# Patient Record
Sex: FEMALE | Race: OTHER | HISPANIC OR LATINO
[De-identification: names, ages, dates, MRNs, and addresses within clinical notes are randomized per-mention and may not be internally consistent; named-entity substitution may affect disease eponyms.]

---

## 2022-07-22 PROBLEM — Z00.00 ENCOUNTER FOR PREVENTIVE HEALTH EXAMINATION: Status: ACTIVE | Noted: 2022-07-22

## 2022-07-25 ENCOUNTER — APPOINTMENT (OUTPATIENT)
Dept: HEART AND VASCULAR | Facility: CLINIC | Age: 27
End: 2022-07-25

## 2022-07-25 PROCEDURE — 99214 OFFICE O/P EST MOD 30 MIN: CPT

## 2022-07-28 NOTE — ASSESSMENT
[FreeTextEntry1] : This is a 26-year-old female who we saw once here (but not treated) about 10 years ago for scattered painful venous malformations in her right upper extremity She subsequently had several of these lesions surgically removed by Dr. Fam (elbow, forearm, hand). She now has two painful lesions, one on the right ulnar wrist and one in the posterior elbow. The elbow lesion is fairly firm and lumpy and quite tender to palpation. Ultrasound demonstrates a complex lesion which is partially compressible. She states mobility at the elbow is affected by this. The wrist lesion is smaller but also tender and on US appears partially thrombosed. There are no associated skin changes and neuromuscular function is grossly intact. Dr. Fam does not think the current lesions are amenable to surgical resection. She was sent for MRI studies of the area which have not yet been completed and are being done at  Radiology. We will get copies of the study once it's done. I went over the nature of the problem and described the indirect embolization procedure. She does wish to proceed once we've seen the imaging. She is otherwise in good health.

## 2022-07-28 NOTE — PHYSICAL EXAM
[Alert] : alert [No Acute Distress] : no acute distress [Well Nourished] : well nourished [Well Developed] : well developed [Supple] : the neck was supple [Normal Rate and Effort] : normal respiratory rhythm and effort [de-identified] : Venous malformations along the posterolateral elbow and medial wrist. [de-identified] : Incisions noted at the elbow, medial wrist, and 5th digit.

## 2022-07-28 NOTE — HISTORY OF PRESENT ILLNESS
[FreeTextEntry1] : 26-year-old female with history of RUE vascular malformations presenting for evaluation of painful right elbow and wrist lesions.

## 2022-10-24 ENCOUNTER — APPOINTMENT (OUTPATIENT)
Dept: HEART AND VASCULAR | Facility: CLINIC | Age: 27
End: 2022-10-24

## 2022-10-24 PROCEDURE — 99213 OFFICE O/P EST LOW 20 MIN: CPT

## 2022-10-26 NOTE — HISTORY OF PRESENT ILLNESS
[FreeTextEntry1] : 26 yo F with venous malformation involving the right upper extremity s/p multiple surgical removal of lesions in elbow,forearm and hand by Dr. Fam presents for follow up visits. \par

## 2022-10-26 NOTE — ASSESSMENT
[FreeTextEntry1] : This is an 27 year old female we have seen her in the office previously but have not treated her in the past. She's  status post multiple surgical resections for a multifocal venous malformation in the right upper extremity. She was referred by Dr Fam. At the current time her most painful area is the posterior elbow where she has moderate soft tissue swelling. There is a scar from prior surgical resection in this area. Neuromuscular function is intact. he She had a recent MRI which shows a partially thrombosed malformation in this area. I did an ultrasound in the office which confirms a partially compressible lesion which should be amenable to sclerotherapy and possibly surgical resection to follow. She also has some discomfort in the wrist where she previously had known venous malformation but to date she has been unable to get insurance coverage for an MRI of this area.

## 2022-10-26 NOTE — PHYSICAL EXAM
[Alert] : alert [No Acute Distress] : no acute distress [Well Nourished] : well nourished [Normal Sclera/Conjunctiva] : normal sclera/conjunctiva [PERRL] : pupils equal, round and reactive to light [EOMI] : extra occular movement intact [Normal Hearing] : hearing was normal [No Neck Mass] : no neck mass was observed [No Respiratory Distress] : no respiratory distress [Normal Rate and Effort] : normal respiratory rhythm and effort [No Accessory Muscle Use] : no accessory muscle use [Not Tender] : non-tender [Not Distended] : not distended [Normal Reflexes] : deep tendon reflexes were 2+ and symmetric [No Motor Deficits] : the motor exam was normal [No Tremors] : no tremors [Oriented x3] : oriented to person, place, and time [Normal Insight/Judgement] : insight and judgment were intact [Normal Affect] : the affect was normal

## 2024-03-06 PROBLEM — Q27.30 ARTERIOVENOUS MALFORMATION, SITE UNSPECIFIED: Chronic | Status: ACTIVE | Noted: 2022-11-02

## 2024-03-18 ENCOUNTER — APPOINTMENT (OUTPATIENT)
Dept: HEART AND VASCULAR | Facility: CLINIC | Age: 29
End: 2024-03-18
Payer: COMMERCIAL

## 2024-03-18 PROCEDURE — 99214 OFFICE O/P EST MOD 30 MIN: CPT

## 2024-03-18 RX ORDER — TIRZEPATIDE 15 MG/.5ML
INJECTION, SOLUTION SUBCUTANEOUS
Refills: 0 | Status: ACTIVE | COMMUNITY

## 2024-03-18 RX ORDER — METFORMIN HYDROCHLORIDE 625 MG/1
TABLET ORAL
Refills: 0 | Status: ACTIVE | COMMUNITY

## 2024-03-19 NOTE — ASSESSMENT
[FreeTextEntry1] : =============================================================== This is a 28 year old female we saw two years ago referred by Dr Fam for a multifocal venous malformation in the right upper extremity. She is right hand dominant. She has had prior surgical resection of the elbow lesion by doctor Cristel but the mass and pain have recurred. She also has pain along the volar aspect of the wrist. Neuromuscular function is intact but she states the pain is constant. She had an MRI done two years ago at [facility] and we don't have access to those images at this moment. On physical exam she has a well healed scar from the prior surgery on the elbow and there is a moderately tender soft tissue mass there. I did an ultrasound in the office which shows a heterogeneous consistency without any definite high flow and only slight compressibility.  The wrist lesion also appears to be a heterogeneous mass with not very impressive vascularity. I told her we should get a new MRI of both areas to decide whether she needs angiography and embolization or just direct embolization. She was given a prescription for the MRI and MRA.      A total of 30 minutes was spent on this visit, including time spent face-to-face and non-face-to-face. Previous notes/ imaging/labs reviewed, obtained medically appropriate history and physical examination and documented the findings in the note. Greater than 50% of the face-to-face encounter time was spent on counseling and/or coordination of care including imaging studies (MRI, CT) and/or ultrasound exam performed at time of visit for multifocal vascular malformations. All relevant risks and benefits were discussed. The patient verbalizes understanding and agreement with the plan.

## 2024-03-19 NOTE — PHYSICAL EXAM
[Alert] : alert [Well Nourished] : well nourished [No Acute Distress] : no acute distress [Normal Sclera/Conjunctiva] : normal sclera/conjunctiva [Well Developed] : well developed [No Proptosis] : no proptosis [EOMI] : extra occular movement intact [Normal Oropharynx] : the oropharynx was normal [No Thyroid Nodules] : there were no palpable thyroid nodules [Thyroid Not Enlarged] : the thyroid was not enlarged [No Respiratory Distress] : no respiratory distress [No Accessory Muscle Use] : no accessory muscle use [Normal Rate] : heart rate was normal  [Clear to Auscultation] : lungs were clear to auscultation bilaterally [Normal S1, S2] : normal S1 and S2 [Regular Rhythm] : with a regular rhythm [Pedal Pulses Normal] : the pedal pulses are present [No Edema] : there was no peripheral edema [Normal Bowel Sounds] : normal bowel sounds [Not Tender] : non-tender [Soft] : abdomen soft [Not Distended] : not distended [Normal Post Cervical Nodes] : posterior cervical nodes [Normal Anterior Cervical Nodes] : anterior cervical nodes [Normal Axillary Nodes] : axillary nodes [No Spinal Tenderness] : no spinal tenderness [Spine Straight] : spine straight [No Stigmata of Cushings Syndrome] : no stigmata of cushings syndrome [Normal Gait] : normal gait [Normal Strength/Tone] : muscle strength and tone were normal [No Rash] : no rash [No Tremors] : no tremors [Normal Reflexes] : deep tendon reflexes were 2+ and symmetric [Oriented x3] : oriented to person, place, and time [de-identified] : Right arm w/ palpable mass of medial distal humerus. Mass is firm and somewhat tender. Similar mass of distal medial wrist. Small soft venous malformation of tip of 1st finger. all on right side.  [Acanthosis Nigricans___] : no acanthosis nigricans

## 2024-03-19 NOTE — HISTORY OF PRESENT ILLNESS
[FreeTextEntry1] : This is a 26 y/o F w/ past history of RUE multifocal vascular malformations since childhood s/p prior resections of masses of her elbow/distal humerus, wrist and fingertip. She has been seen here once as a child and twice in the last two years but has not been treated. She presents today for reassessment in hopes of intervention. She was last seen here about 18 months ago at which time she was planned for sclerotherapy, however she was lost to follow up. She states in the interim she has had a persistent 7/10 throbbing pain of these areas. She also occasionally has functional impairments of her hand or paresthesia, like her hand is asleep. She states she did have an angiogram as a child but does not know its results. Otherwise, she is in good health. She is takes a GLP-1 agonist and metformin for prediabetes. She has no allergies.

## 2024-04-04 ENCOUNTER — APPOINTMENT (OUTPATIENT)
Dept: MRI IMAGING | Facility: CLINIC | Age: 29
End: 2024-04-04
Payer: COMMERCIAL

## 2024-04-04 PROCEDURE — 73225 MR ANGIO UPR EXTR W/O&W/DYE: CPT | Mod: RT

## 2024-04-04 PROCEDURE — A9585: CPT | Mod: JW

## 2024-04-18 ENCOUNTER — TRANSCRIPTION ENCOUNTER (OUTPATIENT)
Age: 29
End: 2024-04-18

## 2024-05-10 VITALS
TEMPERATURE: 99 F | DIASTOLIC BLOOD PRESSURE: 59 MMHG | WEIGHT: 162.04 LBS | RESPIRATION RATE: 16 BRPM | SYSTOLIC BLOOD PRESSURE: 107 MMHG | HEIGHT: 66 IN | OXYGEN SATURATION: 97 % | HEART RATE: 76 BPM

## 2024-05-10 RX ORDER — CHLORHEXIDINE GLUCONATE 213 G/1000ML
1 SOLUTION TOPICAL ONCE
Refills: 0 | Status: DISCONTINUED | OUTPATIENT
Start: 2024-05-15 | End: 2024-05-29

## 2024-05-10 NOTE — H&P ADULT - NSICDXPASTMEDICALHX_GEN_ALL_CORE_FT
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

## 2024-05-10 NOTE — H&P ADULT - NSHPLABSRESULTS_GEN_ALL_CORE
12.6   7.14  )-----------( 314      ( 15 May 2024 09:37 )             38.7       05-15    138  |  102  |  9   ----------------------------<  91  4.2   |  25  |  0.59    Ca    9.5      15 May 2024 09:37  Mg     2.1     05-15    TPro  7.7  /  Alb  4.7  /  TBili  0.9  /  DBili  x   /  AST  17  /  ALT  18  /  AlkPhos  57  05-15    PT/INR - ( 15 May 2024 09:37 )   PT: 11.0 sec;   INR: 0.96        PTT - ( 15 May 2024 09:37 )  PTT:30.9 sec    Urinalysis Basic - ( 15 May 2024 09:37 )  Color: x / Appearance: x / SG: x / pH: x  Gluc: 91 mg/dL / Ketone: x  / Bili: x / Urobili: x   Blood: x / Protein: x / Nitrite: x   Leuk Esterase: x / RBC: x / WBC x   Sq Epi: x / Non Sq Epi: x / Bacteria: x    EKG: NSR @ 68 bpm, isolated TWI lead III, TW flattening in aVF

## 2024-05-10 NOTE — H&P ADULT - HISTORY OF PRESENT ILLNESS
IR: Dr. Judson Pearson  Pharmacy:  Escort:    28F with hx of RUE AVM s/p multiple surgical excision of massess (elbow, forarm, hand with Dr. Fam), who presents to Dr. Pearson with c/o persistent 7/10 throbbing pain in these areas with occasional functional impairments of her hand and paresthesias. Of note pt was last seen a year ago and was planned for slcerotherapy, but was lost to follow-up. RUE MRA (4/04/24): 1.5 x 3.3 x 3cm rounded lesion on posterolateral aspect of the elbow -- demonstrates heterogenous isointense T1 enhancement with rounded hypointense foci internally likely related to calficiation and c/w vascular mlaformation; lesion is located deep to the lateral head of the triceps muscle and abuts the posterior margin of the lateral epicondyle of the distal humerus; there is a vessel extending from the prox axillary artery communicating with the lesion posteriorly -- suggestive of high flow component of the lesion.     In light of pt's persistent RUE pain and MRA findings, pt now presents to [facility] for RUE Angiogram/Embolization with Dr. Pearson.     Risks & benefits of procedure and alternative therapy have been explained to the patient by Dr. Pearson including but not limited to: allergic reaction, bleeding w/possible need for blood transfusion, infection, renal and vascular compromise, limb damage, emergent surgery. Informed consent obtained and in chart.       IR: Dr. Judson Pearson  Pharmacy: [facility] ([address] [place] [address], # [phone])  Escort: Mom (Aaliyah)    28F with hx of RUE AVM s/p multiple surgical excision of massess (elbow, forarm, hand with Dr. Fam) and pre-DM who presents to Dr. Pearson with c/o persistent 7/10 throbbing pain in these areas with occasional functional impairments of her hand and paresthesias. Of note pt was last seen a year ago and was planned for sclerotherapy, but was lost to follow-up. RUE MRA (4/04/24): 1.5 x 3.3 x 3cm rounded lesion on posterolateral aspect of the elbow -- demonstrates heterogenous isointense T1 enhancement with rounded hypointense foci internally likely related to calcification and c/w vascular malformation; lesion is located deep to the lateral head of the triceps muscle and abuts the posterior margin of the lateral epicondyle of the distal humerus; there is a vessel extending from the prox axillary artery communicating with the lesion posteriorly -- suggestive of high flow component of the lesion.     In light of pt's persistent RUE pain and MRA findings, pt now presents to [facility] for RUE Angiogram/Embolization with Dr. Pearson.

## 2024-05-10 NOTE — H&P ADULT - CARDIOVASCULAR
normal/regular rate and rhythm/S1 S2 present/no gallops/no rub/no murmur/no JVD/no pedal edema/peripheral edema/vascular

## 2024-05-15 ENCOUNTER — OUTPATIENT (OUTPATIENT)
Dept: OUTPATIENT SERVICES | Facility: HOSPITAL | Age: 29
LOS: 1 days | Discharge: HOME | End: 2024-05-15
Payer: COMMERCIAL

## 2024-05-15 VITALS — WEIGHT: 160.06 LBS | HEIGHT: 66 IN

## 2024-05-15 DIAGNOSIS — Z87.74 PERSONAL HISTORY OF (CORRECTED) CONGENITAL MALFORMATIONS OF HEART AND CIRCULATORY SYSTEM: Chronic | ICD-10-CM

## 2024-05-15 LAB
ALBUMIN SERPL ELPH-MCNC: 4.7 G/DL — SIGNIFICANT CHANGE UP (ref 3.3–5)
ALP SERPL-CCNC: 57 U/L — SIGNIFICANT CHANGE UP (ref 40–120)
ALT FLD-CCNC: 18 U/L — SIGNIFICANT CHANGE UP (ref 10–45)
ANION GAP SERPL CALC-SCNC: 11 MMOL/L — SIGNIFICANT CHANGE UP (ref 5–17)
APTT BLD: 30.9 SEC — SIGNIFICANT CHANGE UP (ref 24.5–35.6)
AST SERPL-CCNC: 17 U/L — SIGNIFICANT CHANGE UP (ref 10–40)
BASOPHILS # BLD AUTO: 0.04 K/UL — SIGNIFICANT CHANGE UP (ref 0–0.2)
BASOPHILS NFR BLD AUTO: 0.6 % — SIGNIFICANT CHANGE UP (ref 0–2)
BILIRUB SERPL-MCNC: 0.9 MG/DL — SIGNIFICANT CHANGE UP (ref 0.2–1.2)
BUN SERPL-MCNC: 9 MG/DL — SIGNIFICANT CHANGE UP (ref 7–23)
CALCIUM SERPL-MCNC: 9.5 MG/DL — SIGNIFICANT CHANGE UP (ref 8.4–10.5)
CHLORIDE SERPL-SCNC: 102 MMOL/L — SIGNIFICANT CHANGE UP (ref 96–108)
CO2 SERPL-SCNC: 25 MMOL/L — SIGNIFICANT CHANGE UP (ref 22–31)
CREAT SERPL-MCNC: 0.59 MG/DL — SIGNIFICANT CHANGE UP (ref 0.5–1.3)
EGFR: 124 ML/MIN/1.73M2 — SIGNIFICANT CHANGE UP
EOSINOPHIL # BLD AUTO: 0.17 K/UL — SIGNIFICANT CHANGE UP (ref 0–0.5)
EOSINOPHIL NFR BLD AUTO: 2.4 % — SIGNIFICANT CHANGE UP (ref 0–6)
GLUCOSE SERPL-MCNC: 91 MG/DL — SIGNIFICANT CHANGE UP (ref 70–99)
HCG UR QL: NEGATIVE — SIGNIFICANT CHANGE UP
HCT VFR BLD CALC: 38.7 % — SIGNIFICANT CHANGE UP (ref 34.5–45)
HGB BLD-MCNC: 12.6 G/DL — SIGNIFICANT CHANGE UP (ref 11.5–15.5)
IMM GRANULOCYTES NFR BLD AUTO: 0.3 % — SIGNIFICANT CHANGE UP (ref 0–0.9)
INR BLD: 0.96 — SIGNIFICANT CHANGE UP (ref 0.85–1.18)
LYMPHOCYTES # BLD AUTO: 3 K/UL — SIGNIFICANT CHANGE UP (ref 1–3.3)
LYMPHOCYTES # BLD AUTO: 42 % — SIGNIFICANT CHANGE UP (ref 13–44)
MAGNESIUM SERPL-MCNC: 2.1 MG/DL — SIGNIFICANT CHANGE UP (ref 1.6–2.6)
MCHC RBC-ENTMCNC: 27.6 PG — SIGNIFICANT CHANGE UP (ref 27–34)
MCHC RBC-ENTMCNC: 32.6 GM/DL — SIGNIFICANT CHANGE UP (ref 32–36)
MCV RBC AUTO: 84.9 FL — SIGNIFICANT CHANGE UP (ref 80–100)
MONOCYTES # BLD AUTO: 0.51 K/UL — SIGNIFICANT CHANGE UP (ref 0–0.9)
MONOCYTES NFR BLD AUTO: 7.1 % — SIGNIFICANT CHANGE UP (ref 2–14)
NEUTROPHILS # BLD AUTO: 3.4 K/UL — SIGNIFICANT CHANGE UP (ref 1.8–7.4)
NEUTROPHILS NFR BLD AUTO: 47.6 % — SIGNIFICANT CHANGE UP (ref 43–77)
NRBC # BLD: 0 /100 WBCS — SIGNIFICANT CHANGE UP (ref 0–0)
PLATELET # BLD AUTO: 314 K/UL — SIGNIFICANT CHANGE UP (ref 150–400)
POTASSIUM SERPL-MCNC: 4.2 MMOL/L — SIGNIFICANT CHANGE UP (ref 3.5–5.3)
POTASSIUM SERPL-SCNC: 4.2 MMOL/L — SIGNIFICANT CHANGE UP (ref 3.5–5.3)
PROT SERPL-MCNC: 7.7 G/DL — SIGNIFICANT CHANGE UP (ref 6–8.3)
PROTHROM AB SERPL-ACNC: 11 SEC — SIGNIFICANT CHANGE UP (ref 9.5–13)
RBC # BLD: 4.56 M/UL — SIGNIFICANT CHANGE UP (ref 3.8–5.2)
RBC # FLD: 13.8 % — SIGNIFICANT CHANGE UP (ref 10.3–14.5)
SODIUM SERPL-SCNC: 138 MMOL/L — SIGNIFICANT CHANGE UP (ref 135–145)
WBC # BLD: 7.14 K/UL — SIGNIFICANT CHANGE UP (ref 3.8–10.5)
WBC # FLD AUTO: 7.14 K/UL — SIGNIFICANT CHANGE UP (ref 3.8–10.5)

## 2024-05-15 PROCEDURE — C1894: CPT

## 2024-05-15 PROCEDURE — 85610 PROTHROMBIN TIME: CPT

## 2024-05-15 PROCEDURE — C1769: CPT

## 2024-05-15 PROCEDURE — C1887: CPT

## 2024-05-15 PROCEDURE — 81025 URINE PREGNANCY TEST: CPT

## 2024-05-15 PROCEDURE — 83735 ASSAY OF MAGNESIUM: CPT

## 2024-05-15 PROCEDURE — 36216 PLACE CATHETER IN ARTERY: CPT

## 2024-05-15 PROCEDURE — 37241 VASC EMBOLIZE/OCCLUDE VENOUS: CPT | Mod: RT

## 2024-05-15 PROCEDURE — 80053 COMPREHEN METABOLIC PANEL: CPT

## 2024-05-15 PROCEDURE — 37241 VASC EMBOLIZE/OCCLUDE VENOUS: CPT

## 2024-05-15 PROCEDURE — 85025 COMPLETE CBC W/AUTO DIFF WBC: CPT

## 2024-05-15 PROCEDURE — 85730 THROMBOPLASTIN TIME PARTIAL: CPT

## 2024-05-15 RX ORDER — ONDANSETRON 8 MG/1
4 TABLET, FILM COATED ORAL EVERY 4 HOURS
Refills: 0 | Status: DISCONTINUED | OUTPATIENT
Start: 2024-05-15 | End: 2024-05-29

## 2024-05-15 RX ORDER — METFORMIN HYDROCHLORIDE 850 MG/1
1 TABLET ORAL
Refills: 0 | DISCHARGE

## 2024-05-15 RX ORDER — ACETAMINOPHEN 500 MG
1 TABLET ORAL
Qty: 18 | Refills: 0
Start: 2024-05-15 | End: 2024-05-17

## 2024-05-15 RX ORDER — KETOROLAC TROMETHAMINE 30 MG/ML
15 SYRINGE (ML) INJECTION ONCE
Refills: 0 | Status: DISCONTINUED | OUTPATIENT
Start: 2024-05-15 | End: 2024-05-15

## 2024-05-15 RX ORDER — OXYCODONE HYDROCHLORIDE 5 MG/1
1 TABLET ORAL
Qty: 12 | Refills: 0
Start: 2024-05-15 | End: 2024-05-17

## 2024-05-15 RX ORDER — ONDANSETRON 8 MG/1
4 TABLET, FILM COATED ORAL ONCE
Refills: 0 | Status: COMPLETED | OUTPATIENT
Start: 2024-05-15 | End: 2024-05-15

## 2024-05-15 RX ORDER — METOCLOPRAMIDE HCL 10 MG
10 TABLET ORAL ONCE
Refills: 0 | Status: DISCONTINUED | OUTPATIENT
Start: 2024-05-15 | End: 2024-05-29

## 2024-05-15 RX ORDER — ACETAMINOPHEN 500 MG
1000 TABLET ORAL ONCE
Refills: 0 | Status: DISCONTINUED | OUTPATIENT
Start: 2024-05-15 | End: 2024-05-29

## 2024-05-15 RX ORDER — CEPHALEXIN 500 MG
1 CAPSULE ORAL
Qty: 20 | Refills: 0
Start: 2024-05-15 | End: 2024-05-19

## 2024-05-15 RX ORDER — HYDROMORPHONE HYDROCHLORIDE 2 MG/ML
0.5 INJECTION INTRAMUSCULAR; INTRAVENOUS; SUBCUTANEOUS
Refills: 0 | Status: DISCONTINUED | OUTPATIENT
Start: 2024-05-15 | End: 2024-05-15

## 2024-05-15 RX ORDER — OXYCODONE AND ACETAMINOPHEN 5; 325 MG/1; MG/1
1 TABLET ORAL
Qty: 12 | Refills: 0
Start: 2024-05-15 | End: 2024-05-17

## 2024-05-15 RX ORDER — SODIUM CHLORIDE 9 MG/ML
1000 INJECTION, SOLUTION INTRAVENOUS
Refills: 0 | Status: DISCONTINUED | OUTPATIENT
Start: 2024-05-15 | End: 2024-05-29

## 2024-05-15 RX ADMIN — SODIUM CHLORIDE 100 MILLILITER(S): 9 INJECTION, SOLUTION INTRAVENOUS at 15:52

## 2024-05-15 RX ADMIN — HYDROMORPHONE HYDROCHLORIDE 0.5 MILLIGRAM(S): 2 INJECTION INTRAMUSCULAR; INTRAVENOUS; SUBCUTANEOUS at 15:53

## 2024-05-15 RX ADMIN — HYDROMORPHONE HYDROCHLORIDE 0.5 MILLIGRAM(S): 2 INJECTION INTRAMUSCULAR; INTRAVENOUS; SUBCUTANEOUS at 15:45

## 2024-05-15 RX ADMIN — ONDANSETRON 4 MILLIGRAM(S): 8 TABLET, FILM COATED ORAL at 15:42

## 2024-05-15 NOTE — BRIEF OPERATIVE NOTE - OPERATION/FINDINGS
Right elbow AVM. Angiogram performed via R groin 5 fr sheath, no arterial feeders identified. Direct stick embolization of AVM performed.

## 2024-05-28 DIAGNOSIS — R73.03 PREDIABETES: ICD-10-CM

## 2024-05-28 DIAGNOSIS — Q27.31 ARTERIOVENOUS MALFORMATION OF VESSEL OF UPPER LIMB: ICD-10-CM

## 2024-05-28 DIAGNOSIS — Z79.84 LONG TERM (CURRENT) USE OF ORAL HYPOGLYCEMIC DRUGS: ICD-10-CM

## 2024-05-28 DIAGNOSIS — Z79.85 LONG-TERM (CURRENT) USE OF INJECTABLE NON-INSULIN ANTIDIABETIC DRUGS: ICD-10-CM

## 2024-06-19 PROBLEM — R73.03 PREDIABETES: Chronic | Status: ACTIVE | Noted: 2024-05-15

## 2024-07-01 ENCOUNTER — APPOINTMENT (OUTPATIENT)
Dept: HEART AND VASCULAR | Facility: CLINIC | Age: 29
End: 2024-07-01
Payer: COMMERCIAL

## 2024-07-01 DIAGNOSIS — Q27.9 CONGENITAL MALFORMATION OF PERIPHERAL VASCULAR SYSTEM, UNSPECIFIED: ICD-10-CM

## 2024-07-01 DIAGNOSIS — M25.531 PAIN IN RIGHT WRIST: ICD-10-CM

## 2024-07-01 PROCEDURE — 99214 OFFICE O/P EST MOD 30 MIN: CPT

## 2024-07-01 PROCEDURE — G2211 COMPLEX E/M VISIT ADD ON: CPT

## 2024-08-19 DIAGNOSIS — Q27.9 CONGENITAL MALFORMATION OF PERIPHERAL VASCULAR SYSTEM, UNSPECIFIED: ICD-10-CM

## 2024-09-24 DIAGNOSIS — Q27.9 CONGENITAL MALFORMATION OF PERIPHERAL VASCULAR SYSTEM, UNSPECIFIED: ICD-10-CM

## 2024-09-24 NOTE — PHYSICAL EXAM
[Alert] : alert [No Acute Distress] : no acute distress [Well Nourished] : well nourished [Well Developed] : well developed [Normal Sclera/Conjunctiva] : normal sclera/conjunctiva [EOMI] : extra occular movement intact [No Proptosis] : no proptosis [Normal Hearing] : hearing was normal [No Neck Mass] : no neck mass was observed [Supple] : the neck was supple [No Respiratory Distress] : no respiratory distress [No Accessory Muscle Use] : no accessory muscle use [Normal Rate] : heart rate was normal  [Regular Rhythm] : with a regular rhythm [No Edema] : there was no peripheral edema [No Varicosities] : there were no varicosital changes [Not Tender] : non-tender [Soft] : abdomen soft [Not Distended] : not distended [No Spinal Tenderness] : no spinal tenderness [Spine Straight] : spine straight [No Stigmata of Cushings Syndrome] : no stigmata of cushings syndrome [Normal Gait] : normal gait [Normal Strength/Tone] : muscle strength and tone were normal [No Rash] : no rash [Normal Reflexes] : deep tendon reflexes were 2+ and symmetric [No Tremors] : no tremors [Oriented x3] : oriented to person, place, and time [Normal Insight/Judgement] : insight and judgment were intact [Normal Affect] : the affect was normal [Fully active, able to carry on all pre-disease performance without restriction] : Fully active, able to carry on all pre-disease performance without restriction

## 2024-09-24 NOTE — HISTORY OF PRESENT ILLNESS
[FreeTextEntry1] : Valarie Preciado is a right-handed 29-year-old woman with a vascular malformation along the posterolateral aspect of the elbow. Latest MRI suggest a high flow component to the lesion. She had DSE last May and again last August. She is s/p right arm venogram and right wrist DSE using bleomycin and is here today for her 6-week follow up.   Last visit she was noted to have a soft lump in the radial aspect of her right wrist. Procedural findings noted a mass in the right wrist which was primarily fibrotic tissue. Upon discharge she was given information for a hand surgeon, Dr. Emrey Esparza, for possible surgical removal of the painful mass.  [de-identified] : 03/21/2024: Along the posterolateral aspect of the elbow there is a rounded lesion measuring approximately 1.5 x 3.3 x 3 cm. This is suggestive of a high flow component to the lesion.  [de-identified] : 05/15/2024: S/P DSE w/ bleomycin of the right elbow 08/20/2024: Venogram and DSE w/ bleomycin

## 2024-09-24 NOTE — END OF VISIT
[FreeTextEntry3] : I, Dr. Pearson, personally performed the evaluation and management (E/M) services for this established patient who presents today with (a) new problem(s)/exacerbation of (an) existing condition(s). That E/M includes conducting the examination, assessing all new/exacerbated conditions, and establishing a new plan of care. Today, my ACP, Lluvia ORTIZ, was here to observe my evaluation and management services for this new problem/exacerbated condition to be followed going forward. [Time Spent: ___ minutes] : I have spent [unfilled] minutes of time on the encounter which excludes teaching and separately reported services.

## 2024-10-07 ENCOUNTER — APPOINTMENT (OUTPATIENT)
Dept: HEART AND VASCULAR | Facility: CLINIC | Age: 29
End: 2024-10-07
Payer: COMMERCIAL

## 2024-10-07 DIAGNOSIS — M25.531 PAIN IN RIGHT WRIST: ICD-10-CM

## 2024-10-07 DIAGNOSIS — Q27.9 CONGENITAL MALFORMATION OF PERIPHERAL VASCULAR SYSTEM, UNSPECIFIED: ICD-10-CM

## 2024-10-07 PROCEDURE — 99214 OFFICE O/P EST MOD 30 MIN: CPT

## 2024-10-07 PROCEDURE — G2211 COMPLEX E/M VISIT ADD ON: CPT
